# Patient Record
Sex: FEMALE | Race: WHITE | Employment: FULL TIME | URBAN - METROPOLITAN AREA
[De-identification: names, ages, dates, MRNs, and addresses within clinical notes are randomized per-mention and may not be internally consistent; named-entity substitution may affect disease eponyms.]

---
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
## 2024-09-15 PROBLEM — Z01.419 WELL WOMAN EXAM WITH ROUTINE GYNECOLOGICAL EXAM: Status: ACTIVE | Noted: 2024-09-15

## 2024-09-15 NOTE — PROGRESS NOTES
Subjective   Patient ID: Melodie Jamison is a 19 y.o. female who presents for New Patient Visit (C/o painful periods about every 21 days.  Discuss BC options.).  She presents as a new patient for gyn visit. No prior gyn records are found on review of the EMR.    She notes painful menses. Menses started at age 11-12. She did feel the pain improved for a while but pain has increased over the past year. Menses last 6-7 days with about 21 days off between. The first three days are very painful. She has cramping associated with nausea and fatigue and bloating. Advil provides some relief. Heat helps some also.   She requests to start oral contraceptive pills for menses. She understands that using hormonal contraception does increase her risk of venous thrombotic events including but not limited to deep venous thrombosis, stroke and heart disease.  She wishes to use this medicine despite this risk.   She does disclose that her mother is on blood thinners but she is not aware of the reason. She is not aware of any genetic clotting disorder but she agrees to review this with her mother and will let us know.     She has not been sexually active and denies any concern for infection or discharge. She declines pelvic exam today.          Review of Systems   Constitutional:  Negative for activity change.   HENT:  Negative for congestion.    Respiratory:  Negative for apnea and cough.    Cardiovascular:  Negative for chest pain.   Gastrointestinal:  Negative for constipation and diarrhea.   Genitourinary:  Negative for hematuria and vaginal pain.   Musculoskeletal:  Negative for joint swelling.   Neurological:  Negative for dizziness.   Psychiatric/Behavioral:  Negative for agitation.        History reviewed. No pertinent past medical history.   History reviewed. No pertinent surgical history.   No Known Allergies   Current Outpatient Medications on File Prior to Visit   Medication Sig Dispense Refill    fluoride, sodium,  (Prevident 5000 Booster) 1.1 % dental paste BRUSH TWICE DAILY WITH A PEA SIZED AMOUNT. EXPECTORATE. DO NOT RINSE.       No current facility-administered medications on file prior to visit.        Objective   Physical Exam  Constitutional:       Appearance: Normal appearance.   Genitourinary:     Comments: Pelvic exam is declined.   Neurological:      Mental Status: She is alert and oriented to person, place, and time.   Psychiatric:         Attention and Perception: Attention normal.         Mood and Affect: Mood and affect normal.         Speech: Speech normal.         Behavior: Behavior normal. Behavior is cooperative.           Problem List Items Addressed This Visit       Well woman exam with routine gynecological exam - Primary    Dysmenorrhea    Overview     Painful monthly menses since menarche.   She desires to begin combined oral contraceptive pills for cramping with menses.   She understands that using hormonal contraception does increase her risk of venous thrombotic events including but not limited to deep venous thrombosis, stroke and heart disease.  She wishes to use this medicine despite this risk.          Current Assessment & Plan     Follow up is planned in 3-4 months.   She will obtain more history regarding potential family history of blood clot.   Ibuprofen 600 mg is recommended to take every 6 hours with menstrual cramps.          Relevant Medications    norethindrone-e.estradioL-iron (Loestrin 24 FE) 1 mg-20 mcg (24)/75 mg (4) tablet

## 2024-09-19 ENCOUNTER — APPOINTMENT (OUTPATIENT)
Dept: OBSTETRICS AND GYNECOLOGY | Facility: CLINIC | Age: 19
End: 2024-09-19

## 2024-09-19 VITALS
BODY MASS INDEX: 21.51 KG/M2 | WEIGHT: 126 LBS | SYSTOLIC BLOOD PRESSURE: 104 MMHG | DIASTOLIC BLOOD PRESSURE: 60 MMHG | HEIGHT: 64 IN

## 2024-09-19 DIAGNOSIS — N94.6 DYSMENORRHEA: ICD-10-CM

## 2024-09-19 DIAGNOSIS — Z01.419 WELL WOMAN EXAM WITH ROUTINE GYNECOLOGICAL EXAM: Primary | ICD-10-CM

## 2024-09-19 RX ORDER — SODIUM FLUORIDE 6 MG/ML
PASTE, DENTIFRICE DENTAL
COMMUNITY
Start: 2024-07-07

## 2024-09-19 ASSESSMENT — ENCOUNTER SYMPTOMS
COUGH: 0
DIZZINESS: 0
AGITATION: 0
ACTIVITY CHANGE: 0
CONSTIPATION: 0
JOINT SWELLING: 0
DIARRHEA: 0
APNEA: 0
HEMATURIA: 0

## 2024-09-19 NOTE — ASSESSMENT & PLAN NOTE
Follow up is planned in 3-4 months.   She will obtain more history regarding potential family history of blood clot.   Ibuprofen 600 mg is recommended to take every 6 hours with menstrual cramps.

## 2024-12-17 NOTE — PROGRESS NOTES
Subjective   Patient ID: Melodie Jamison is a 19 y.o. female who presents for Follow-up (On birth control ).  9/19/2024 documentation:  She presents as a new patient for gyn visit. No prior gyn records are found on review of the EMR.    She notes painful menses. Menses started at age 11-12. She did feel the pain improved for a while but pain has increased over the past year. Menses last 6-7 days with about 21 days off between. The first three days are very painful. She has cramping associated with nausea and fatigue and bloating. Advil provides some relief. Heat helps some also.   She requests to start oral contraceptive pills for menses. She understands that using hormonal contraception does increase her risk of venous thrombotic events including but not limited to deep venous thrombosis, stroke and heart disease.  She wishes to use this medicine despite this risk.   She does disclose that her mother is on blood thinners but she is not aware of the reason. She is not aware of any genetic clotting disorder but she agrees to review this with her mother and will let us know.     She has not been sexually active and denies any concern for infection or discharge. She declines pelvic exam today.    12/19/2024:  At last visit she elected to begin combined oral contraceptive pills to treat dysmenorrhea. Pelvic exam was declined at that visit.   She is doing well with OCP. She feels the cramps and bleeding is improved. She desires to continue taking them with monthly menses.   She was also to provide further family medical history, especially in relation to blood clot or VTE. Her mother stated she has not been diagnosed with any clotting disorder.     She does have some discomfort with intercourse. We reviewed using lubricant, importance of foreplay, relaxation techniques for pelvic floor. She declines STD screen or pelvic exam today.          Review of Systems   Constitutional:  Negative for activity change.   HENT:   Negative for congestion.    Respiratory:  Negative for apnea and cough.    Cardiovascular:  Negative for chest pain.   Gastrointestinal:  Negative for constipation and diarrhea.   Genitourinary:  Negative for hematuria and vaginal pain.   Musculoskeletal:  Negative for joint swelling.   Neurological:  Negative for dizziness.   Psychiatric/Behavioral:  Negative for agitation.        History reviewed. No pertinent past medical history.   History reviewed. No pertinent surgical history.   No Known Allergies   Current Outpatient Medications on File Prior to Visit   Medication Sig Dispense Refill    fluoride, sodium, (Prevident 5000 Booster) 1.1 % dental paste BRUSH TWICE DAILY WITH A PEA SIZED AMOUNT. EXPECTORATE. DO NOT RINSE.      norethindrone-e.estradioL-iron (Loestrin 24 FE) 1 mg-20 mcg (24)/75 mg (4) tablet Take 1 tablet by mouth once daily. 84 tablet 3     No current facility-administered medications on file prior to visit.        Objective   Physical Exam  Constitutional:       Appearance: Normal appearance.   Neurological:      Mental Status: She is alert and oriented to person, place, and time.   Psychiatric:         Attention and Perception: Attention normal.         Mood and Affect: Mood and affect normal.         Speech: Speech normal.         Behavior: Behavior normal. Behavior is cooperative.           Problem List Items Addressed This Visit       Dysmenorrhea - Primary    Overview     Painful monthly menses since menarche.   She desires to begin combined oral contraceptive pills for cramping with menses.   She understands that using hormonal contraception does increase her risk of venous thrombotic events including but not limited to deep venous thrombosis, stroke and heart disease.  She wishes to use this medicine despite this risk.          Current Assessment & Plan     She desires to continue OCP and has good results with this.  She declines STD screen and exam. Follow up in 1 year or as needed.

## 2024-12-19 ENCOUNTER — APPOINTMENT (OUTPATIENT)
Dept: OBSTETRICS AND GYNECOLOGY | Facility: CLINIC | Age: 19
End: 2024-12-19
Payer: COMMERCIAL

## 2024-12-19 VITALS — SYSTOLIC BLOOD PRESSURE: 120 MMHG | BODY MASS INDEX: 22.32 KG/M2 | DIASTOLIC BLOOD PRESSURE: 68 MMHG | WEIGHT: 128 LBS

## 2024-12-19 DIAGNOSIS — N94.6 DYSMENORRHEA: Primary | ICD-10-CM

## 2024-12-19 PROCEDURE — 99213 OFFICE O/P EST LOW 20 MIN: CPT | Performed by: OBSTETRICS & GYNECOLOGY

## 2024-12-19 PROCEDURE — 1036F TOBACCO NON-USER: CPT | Performed by: OBSTETRICS & GYNECOLOGY

## 2024-12-19 ASSESSMENT — ENCOUNTER SYMPTOMS
COUGH: 0
AGITATION: 0
ACTIVITY CHANGE: 0
DIZZINESS: 0
JOINT SWELLING: 0
DIARRHEA: 0
CONSTIPATION: 0
APNEA: 0
HEMATURIA: 0

## 2024-12-19 NOTE — ASSESSMENT & PLAN NOTE
She desires to continue OCP and has good results with this.  She declines STD screen and exam. Follow up in 1 year or as needed.

## 2025-06-06 DIAGNOSIS — N94.6 DYSMENORRHEA: ICD-10-CM

## 2025-06-07 RX ORDER — NORETHINDRONE ACETATE AND ETHINYL ESTRADIOL 1MG-20(24)
1 KIT ORAL DAILY
Qty: 84 TABLET | Refills: 3 | Status: SHIPPED | OUTPATIENT
Start: 2025-06-07

## 2026-01-13 ENCOUNTER — APPOINTMENT (OUTPATIENT)
Dept: OBSTETRICS AND GYNECOLOGY | Facility: CLINIC | Age: 21
End: 2026-01-13
Payer: COMMERCIAL